# Patient Record
(demographics unavailable — no encounter records)

---

## 2025-02-10 NOTE — PLAN
[FreeTextEntry1] : Pt understands that at minimum she needs to be fully assessed in person for CVA given her symptoms.  Declined EMS transport.

## 2025-02-10 NOTE — ASSESSMENT
[FreeTextEntry1] : Symptoms as above, typically R sided but now L.  Acute, LKW 2 hrs ago.  No obvious FND.  Uncertain if this is anxiety v long COVID v (less likely) CVA/TIA.  Discussed at length that virtual eval was not sufficient to assess for CVA and as such she needed to be evaluated in the ED.  Recommended EMS, which she declined, stated she could get to ED quickly.

## 2025-02-10 NOTE — HISTORY OF PRESENT ILLNESS
[Home] : at home, [unfilled] , at the time of the visit. [Other Location: e.g. Home (Enter Location, City,State)___] : at [unfilled] [Telehealth (audio & video)] : This visit was provided via telehealth using real-time 2-way audio visual technology. [Verbal consent obtained from patient] : the patient, [unfilled] [FreeTextEntry8] : Pt has partial numbness on R side of face, occassionally on L side,  Tonight had mild HA and L ear "tingling" and L facial tingling"  believes this is related to her anxiety Normally these symptoms are on the R side of her face, now it is on the L side. Symptoms started ~2 hrs ago, improving but waxing/waning Associated with L arm tingling L "pulsating pain" about her L ear Took medications for anxiety; symptoms are waxing and waning.  Above symptoms are associated with prior e/o COVID but she has tested negative for COVID in the last few days.    PMH -- ADHD, anxiety/ depression, panic attack, asthma, IBS, liver lesion (congenital?), optic disc drusen, herniated disc lumbar region, chronic hives, IIH  No history of CVA  patient had a similar episode 1 yr ago, attributed at that time to anxiety/long COVID

## 2025-02-10 NOTE — PHYSICAL EXAM
[de-identified] : PLEASE SEE HPI FOR ADDITIONAL RELEVANT PHYSICAL EXAM FINDINGS GENERAL: no acute distress, nontoxic HEAD: normocephalic EYES: no scleral icterus NECK: trachea midline, Full ROM RESP: no respiratory distress ABD: nondistended MSK: no gross deformity NEURO: alert & fully oriented.  No obvious CN 7 deficit.  OCHOA symmetrically.   SKIN: no rash PSYCH: cooperative, good insight, appropriate, fluent speech